# Patient Record
Sex: MALE | Race: WHITE
[De-identification: names, ages, dates, MRNs, and addresses within clinical notes are randomized per-mention and may not be internally consistent; named-entity substitution may affect disease eponyms.]

---

## 2018-04-10 ENCOUNTER — HOSPITAL ENCOUNTER (EMERGENCY)
Dept: HOSPITAL 80 - FED | Age: 23
Discharge: HOME | End: 2018-04-10
Payer: COMMERCIAL

## 2018-04-10 VITALS — DIASTOLIC BLOOD PRESSURE: 77 MMHG | SYSTOLIC BLOOD PRESSURE: 121 MMHG

## 2018-04-10 DIAGNOSIS — R07.9: Primary | ICD-10-CM

## 2018-04-10 DIAGNOSIS — E86.9: ICD-10-CM

## 2018-04-10 LAB
INR PPP: 1.22 (ref 0.83–1.16)
PLATELET # BLD: 188 10^3/UL (ref 150–400)
PROTHROMBIN TIME: 15.6 SEC (ref 12–15)

## 2018-04-10 NOTE — CPEKG
Heart Rate: 75

RR Interval: 800

P-R Interval: 176

QRSD Interval: 100

QT Interval: 344

QTC Interval: 385

P Axis: 56

QRS Axis: 62

T Wave Axis: 31

EKG Severity - NORMAL ECG -

EKG Impression: SINUS RHYTHM

EKG Impression: ST ELEV, PROBABLE NORMAL EARLY REPOL PATTERN

Electronically Signed By: Fadi Ramos 10-Apr-2018 16:11:16

## 2018-04-10 NOTE — EDPHY
H & P


Stated Complaint: cp/l arm pain today/sent from pcp to r/o pericarditis ekg nsr


Time Seen by Provider: 04/10/18 15:33


HPI/ROS: 





CHIEF COMPLAINT:  Chest pain, weakness





HISTORY OF PRESENT ILLNESS:  Patient is a 22-year-old man with no significant 

past medical history who comes to the emergency department his mom complaining 

of mild chest pain radiating to his left shoulder.  It is worsened by palpation 

and he states that is worsened when he lifts boards at work.  He works at the 

hardware store.  He states that his symptoms began yesterday.  No recent fevers 

or illness although he did have the flu in January.  No palpitations.  No 

shortness of breath.  No travel.  No smoking.  No hormones.  No leg pain or 

swelling.  He was seen at his primary Dr. Garner was office today concern for 

pericarditis.  He had an EKG that was normal other than slightly tachycardic 

and was referred to the ER.  He does not have pleuritic pain.  Mom does have a 

history of congenital heart disease as well as what sounds like vaso spasming.  

Patient also complains of some body aches including neck stiffness and a mild 

headache.  He has not had a fever.








REVIEW OF SYSTEMS:


Constitutional:  denies: chills, fever, recent illness, recent injury


EENTM: denies: blurred vision, double vision, nose congestion


Respiratory: denies: cough, shortness of breath


Cardiac:  See HPI


Gastrointestinal/Abdominal: denies: abdominal pain, diarrhea, nausea, vomiting, 

blood streaked stools


Genitourinary: denies: dysuria, frequency, hematuria, pain


Musculoskeletal: denies: joint pain, muscle pain


Skin: denies: lesions, rash, jaundice, bruising


Neurological: denies: headache, numbness, paresthesia, tingling, dizziness, 

weakness


Hematologic/Lymphatic: denies: blood clots, easy bleeding, easy bruising


Immunologic/allergic: denies: HIV/AIDS, transplant








EXAM:


GENERAL:  Well-appearing, well-nourished and in no acute distress.


HEAD:  Atraumatic, normocephalic.


EYES:  Pupils equal round and reactive to light, extraocular movements intact, 

sclera anicteric, conjunctiva are normal.


ENT:  TMs normal, nares patent, oropharynx clear without exudates.  Moist 

mucous membranes.


NECK:  Normal range of motion, supple without lymphadenopathy or JVD.


LUNGS:  Breath sounds clear to auscultation bilaterally and equal.  No wheezes 

rales or rhonchi.


HEART:  Pain reproducible with deep palpation.  Regular rate and rhythm without 

murmurs, rubs or gallops laying or sitting.


ABDOMEN:  Soft, nontender, normoactive bowel sounds.  No guarding, no rebound.  

No masses appreciated.


BACK:  No CVA tenderness, no spinal tenderness, step-offs or deformities


EXTREMITIES:  Normal range of motion, no pitting or edema.  No clubbing or 

cyanosis.


NEUROLOGICAL:  Cranial nerves II through XII grossly intact.  Normal speech, 

normal gait.  5/5 strength, normal movement in all extremities, normal sensation


PSYCH:  Normal mood, normal affect.


SKIN:  Warm, dry, normal turgor, no visible rashes or lesions.








Source: Patient


Exam Limitations: No limitations





- Personal History


Current Tetanus/Diphtheria Vaccine: Unsure





- Medical/Surgical History


Hx Asthma: No


Hx Chronic Respiratory Disease: No


Hx Diabetes: No


Hx Cardiac Disease: No


Hx Renal Disease: No


Hx Cirrhosis: No


Hx Alcoholism: No


Hx HIV/AIDS: No


Hx Splenectomy or Spleen Trauma: No


Other PMH: denies





- Family History


Significant Family History: No pertinent family hx





- Social History


Smoking Status: Never smoked


Alcohol Use: Sober


Constitutional: 


 Initial Vital Signs











Temperature (C)  37.1 C   04/10/18 14:16


 


Heart Rate  86   04/10/18 14:16


 


Respiratory Rate  18   04/10/18 14:16


 


Blood Pressure  125/86 H  04/10/18 14:16


 


O2 Sat (%)  98   04/10/18 14:16








 











O2 Delivery Mode               Room Air














Allergies/Adverse Reactions: 


 





Penicillins Allergy (Verified 07/03/10 00:33)


 








Home Medications: 














 Medication  Instructions  Recorded


 


NK [No Known Home Meds]  04/10/18














Medical Decision Making





- Diagnostics


EKG Interpretation: 





An EKG obtained and was read and documented in trace view.  Please see trace 

view for full reading and report. , normal sinus rhythm, no acute ischemic 

changes, mild early repolarization unchanged from previous


Imaging Results: 


 Imaging Impressions





Chest X-Ray  04/10/18 16:06


Impression: No acute abnormality.











ED Course/Re-evaluation: 





5:00 p.m. I spoke with Dr. Mancia who read the echo and states that it looks 

normal, no signs of vegetations are pericardial effusion.  He does not have 

signs of pericarditis on EKG.





5:15 p.m. we discussed the test results which are overall very reassuring.  

Patient states that he feels much better after medication.  He no longer has a 

headache or neck stiffness.  He did have a low-grade fever of 99. I offered to 

perform a lumbar puncture which he and his mom decline at this point.  I warned 

them to return if his symptoms worsen or if he had a fever over 101. They agree 

to this.  They agree to follow up in 24 hr either here or with Dr. Garner.  


Differential Diagnosis: 





Partial list of the Differential diagnosis considered include but were not 

limited to;  pericarditis, viral syndrome and although unlikely based on the 

history and physical exam, I also considered acute coronary disease, meningitis

, sepsis, PE, pneumonia.  I discussed these differential diagnoses and the plan 

with the patient as well as the usual and expected course.  The patient 

understands that the diagnosis is provisional and that in medicine we are not 

always correct and that further workup is often warranted.  Usual and customary 

warnings were given.  All of the patient's questions were answered.  The 

patient was instructed to return to the emergency department should the 

symptoms at all worsen or return, otherwise to followup with the physician as 

we discussed.





- Data Points


Laboratory Results: 


 Laboratory Results





 04/10/18 15:40 





 04/10/18 15:40 





 











  04/10/18 04/10/18 04/10/18





  15:40 15:40 15:40


 


WBC      6.16 10^3/uL 10^3/uL





     (3.80-9.50) 


 


RBC      5.64 10^6/uL 10^6/uL





     (4.40-6.38) 


 


Hgb      16.2 g/dL g/dL





     (13.7-17.5) 


 


Hct      47.2 % %





     (40.0-51.0) 


 


MCV      83.7 fL fL





     (81.5-99.8) 


 


MCH      28.7 pg pg





     (27.9-34.1) 


 


MCHC      34.3 g/dL g/dL





     (32.4-36.7) 


 


RDW      12.9 % %





     (11.5-15.2) 


 


Plt Count      188 10^3/uL 10^3/uL





     (150-400) 


 


MPV      9.8 fL fL





     (8.7-11.7) 


 


Neut % (Auto)      69.7 % %





     (39.3-74.2) 


 


Lymph % (Auto)      18.3 % %





     (15.0-45.0) 


 


Mono % (Auto)      10.7 % %





     (4.5-13.0) 


 


Eos % (Auto)      0.5 % L %





     (0.6-7.6) 


 


Baso % (Auto)      0.5 % %





     (0.3-1.7) 


 


Nucleat RBC Rel Count      0.0 % %





     (0.0-0.2) 


 


Absolute Neuts (auto)      4.29 10^3/uL 10^3/uL





     (1.70-6.50) 


 


Absolute Lymphs (auto)      1.13 10^3/uL 10^3/uL





     (1.00-3.00) 


 


Absolute Monos (auto)      0.66 10^3/uL 10^3/uL





     (0.30-0.80) 


 


Absolute Eos (auto)      0.03 10^3/uL 10^3/uL





     (0.03-0.40) 


 


Absolute Basos (auto)      0.03 10^3/uL 10^3/uL





     (0.02-0.10) 


 


Absolute Nucleated RBC      0.00 10^3/uL 10^3/uL





     (0-0.01) 


 


Immature Gran %      0.3 % %





     (0.0-1.1) 


 


Immature Gran #      0.02 10^3/uL 10^3/uL





     (0.00-0.10) 


 


PT    15.6 SEC H SEC  





    (12.0-15.0)  


 


INR    1.22  H   





    (0.83-1.16)  


 


APTT    30.9 SEC SEC  





    (23.0-38.0)  


 


D-Dimer    < 0.27 ug/mLFEU ug/mLFEU  





    (0.00-0.50)  


 


Sodium  141 mEq/L mEq/L    





   (135-145)   


 


Potassium  3.8 mEq/L mEq/L    





   (3.5-5.2)   


 


Chloride  102 mEq/L mEq/L    





   ()   


 


Carbon Dioxide  26 mEq/l mEq/l    





   (22-31)   


 


Anion Gap  13 mEq/L mEq/L    





   (8-16)   


 


BUN  13 mg/dL mg/dL    





   (7-23)   


 


Creatinine  1.0 mg/dL mg/dL    





   (0.7-1.3)   


 


Estimated GFR  > 60     





    


 


Glucose  75 mg/dL mg/dL    





   ()   


 


Calcium  9.2 mg/dL mg/dL    





   (8.5-10.4)   


 


Troponin I  < 0.012 ng/mL ng/mL    





   (0.000-0.034)   











Medications Given: 


 








Discontinued Medications





Sodium Chloride (Ns)  1,000 mls @ 0 mls/hr IV EDNOW ONE; Wide Open


   PRN Reason: Protocol


   Stop: 04/10/18 16:07


   Last Admin: 04/10/18 16:31 Dose:  1,000 mls


Ketorolac Tromethamine (Toradol)  30 mg IVP EDNOW ONE


   Stop: 04/10/18 16:10


   Last Admin: 04/10/18 16:31 Dose:  30 mg








Departure





- Departure


Disposition: Home, Routine, Self-Care


Clinical Impression: 


Chest pain


Qualifiers:


 Chest pain type: unspecified Qualified Code(s): R07.9 - Chest pain, unspecified





Condition: Fair


Instructions:  Chest Pain (ED)


Additional Instructions: 


Return to the emergency department if you have a fever over 101 or and a 

headache or if her symptoms worsen or if he have any altered mental status 

changes as we discussed.


Referrals: 


ABDULLAHI GARNER [Primary Care Provider] - As per Instructions

## 2018-04-11 NOTE — ECHO
[email or web address]





[address] [address] 

Main: [phone] 



Fax: 



Transthoracic Echocardiogram 

Name:             LAKEISHA SAINI                            MR#:

[id number]

Study Date:       04/10/2018                              Study Time:

04:35 PM

YOB: 1995                              Age:

22 year(s)

Height:           185.4 cm (73 in.)                       Weight:

79.83 kg (176 lb.)

BSA:              2.04 m2                                 Gender:

Male

Examination:      Echo                                    Indication:

Chest Pain

Image Quality:                                            Contrast: 

Requested by:     Fadi Ramos                           BP:

/

Heart Rate:                                               Rhythm: 

Indication:       Chest Pain 



Procedure Staff 

Ultrasound Technician:   Winifred Romero MARCELLE 

Reading Physician:       Fredo Mancia MD 

Requesting Provider: 



Conclusions:          Normal size left ventricle.  

Normal global systolic LV function.  

The ejection fraction is estimated to be 65-70 %.  

No regional wall motion abnormality.  

Trivial mitral valve regurgitation.  

Trivial tricuspid valve regurgitation.  

No pericardial effusion. 



Measurements: 

Chambers                    Valvular Assessment AV/MV

Valvular Assessment TV/PV



Normal                                   Normal

Normal

Name         Value     Range              Name         Value Range

Name           Value Range

Ao Diamond (MM): 3.4 cm    (2.2 cm-3.7            AV Vmax:     1.10 m/s (1

m/s-1.7       TR Vmax:       2.44 mm/s ( - )



cm)                                  m/s)             TR PGmax:

24 mmHg ( - )

IVSd (2D):   0.8 cm (0.6 cm-1.1               AV maxP mmHg ( -

)          syst. PAP: 29 mmHg  ( - )



cm)                AV meanPG:   3 mmHg ( - )  

LVDd (2D):   4.8 cm    (4.2 cm-5.9            MV E Vmax:   0.82 m/s (

- )



cm)                MV A Vmax:   0.35 m/s ( - )  

LVDs (2D):   2.5 cm    (2.1 cm-4              MV E/A:      2.34 ( - )





cm)   

LVPWd (2D):  0.6 cm    (0.6 cm-1 



cm)   

LVEF (MOD4): 68 %      (>=55 %)   

EF Range:    65-70 % 



Continued Measurements: 

Chambers                    Valvular Assessment AV/MV

Valvular Assessment TV/PV



Name                     Value            Name

Value     Name                    Value

LADs:                  3.7 cm                 MV E/E' Septal:

7.50      CVP (est.):            5 mmHg



Patient: LAKEISHA SAINI                         MRN: [id number]

Study Date: 04/10/2018   Page 1 of 2

04:35 PM 









LADs Lon.7 cm MV E/E' Lateral: 4.40  

LA Area:               15.8 cm2   



Findings:             Left Ventricle: 

Normal size left ventricle. No LV hypertrophy. Normal global systolic

LV function. The ejection

fraction is estimated to be 65-70 %. No regional wall motion

abnormality. Normal diastolic LV

function.  

Right Ventricle: 

Normal size right ventricle.  

Left Atrium: 

The left atrium is normal in size.  

Right Atrium: 

The right atrium is normal in size.  

Mitral Valve: 

The mitral valve is normal in appearance and function. Trivial mitral

valve regurgitation.

Aortic Valve: 

The aortic valve is normal in appearance and function.  

Tricuspid Valve: 

The tricuspid valve is normal in appearance and function. Trivial

tricuspid valve regurgitation.

Pulmonic Valve: 

The pulmonic valve is normal in appearance and function.  

Aorta: 

The aorta is normal.  

Pericardium: 

No pericardial effusion. 







Electronically signed by Fredo Mancia MD on 2018 at 08:29 AM 

(No Signature Object) 



Patient: LAKEISHA SAINI                         MRN: [id number]

Study Date: 04/10/2018   Page 2 of 2

04:35 PM 







[id number]

## 2018-10-30 ENCOUNTER — HOSPITAL ENCOUNTER (OUTPATIENT)
Dept: HOSPITAL 80 - FIMAGING | Age: 23
End: 2018-10-30
Attending: FAMILY MEDICINE
Payer: COMMERCIAL

## 2018-10-30 DIAGNOSIS — J34.2: Primary | ICD-10-CM
